# Patient Record
Sex: FEMALE | Race: ASIAN | NOT HISPANIC OR LATINO | Employment: FULL TIME | URBAN - METROPOLITAN AREA
[De-identification: names, ages, dates, MRNs, and addresses within clinical notes are randomized per-mention and may not be internally consistent; named-entity substitution may affect disease eponyms.]

---

## 2017-03-07 ENCOUNTER — TRANSFERRED RECORDS (OUTPATIENT)
Dept: HEALTH INFORMATION MANAGEMENT | Facility: CLINIC | Age: 27
End: 2017-03-07

## 2017-03-14 DIAGNOSIS — H53.10 SUBJECTIVE VISUAL DISTURBANCE: Primary | ICD-10-CM

## 2024-09-12 ASSESSMENT — PATIENT HEALTH QUESTIONNAIRE - PHQ9: SUM OF ALL RESPONSES TO PHQ QUESTIONS 1-9: 3

## 2024-09-12 ASSESSMENT — ANXIETY QUESTIONNAIRES: GAD7 TOTAL SCORE: 1

## 2024-09-13 ENCOUNTER — OFFICE VISIT (OUTPATIENT)
Dept: FAMILY MEDICINE | Facility: CLINIC | Age: 34
End: 2024-09-13
Payer: COMMERCIAL

## 2024-09-13 VITALS
WEIGHT: 182 LBS | BODY MASS INDEX: 31.07 KG/M2 | SYSTOLIC BLOOD PRESSURE: 114 MMHG | HEART RATE: 104 BPM | TEMPERATURE: 97.3 F | DIASTOLIC BLOOD PRESSURE: 62 MMHG | HEIGHT: 64 IN | OXYGEN SATURATION: 96 %

## 2024-09-13 DIAGNOSIS — Z13.29 SCREENING FOR THYROID DISORDER: ICD-10-CM

## 2024-09-13 DIAGNOSIS — M32.9 SYSTEMIC LUPUS ERYTHEMATOSUS, UNSPECIFIED SLE TYPE, UNSPECIFIED ORGAN INVOLVEMENT STATUS (H): ICD-10-CM

## 2024-09-13 DIAGNOSIS — Z00.00 ROUTINE GENERAL MEDICAL EXAMINATION AT A HEALTH CARE FACILITY: ICD-10-CM

## 2024-09-13 DIAGNOSIS — Z11.4 SCREENING FOR HIV (HUMAN IMMUNODEFICIENCY VIRUS): Primary | ICD-10-CM

## 2024-09-13 DIAGNOSIS — Z12.4 CERVICAL CANCER SCREENING: ICD-10-CM

## 2024-09-13 DIAGNOSIS — Z13.220 LIPID SCREENING: ICD-10-CM

## 2024-09-13 DIAGNOSIS — M32.14 LUPUS NEPHRITIS, ISN/RPS CLASS IV (H): ICD-10-CM

## 2024-09-13 DIAGNOSIS — Z13.1 SCREENING FOR DIABETES MELLITUS: ICD-10-CM

## 2024-09-13 DIAGNOSIS — Z11.59 NEED FOR HEPATITIS C SCREENING TEST: ICD-10-CM

## 2024-09-13 PROBLEM — F41.8 DEPRESSION WITH ANXIETY: Status: ACTIVE | Noted: 2017-05-03

## 2024-09-13 PROBLEM — R80.1 PERSISTENT PROTEINURIA: Status: ACTIVE | Noted: 2020-01-03

## 2024-09-13 LAB
ALBUMIN SERPL BCG-MCNC: 3 G/DL (ref 3.5–5.2)
ALBUMIN UR-MCNC: >=300 MG/DL
ALP SERPL-CCNC: 79 U/L (ref 40–150)
ALT SERPL W P-5'-P-CCNC: 29 U/L (ref 0–50)
ANION GAP SERPL CALCULATED.3IONS-SCNC: 10 MMOL/L (ref 7–15)
APPEARANCE UR: CLEAR
AST SERPL W P-5'-P-CCNC: 30 U/L (ref 0–45)
BACTERIA #/AREA URNS HPF: ABNORMAL /HPF
BILIRUB SERPL-MCNC: 0.3 MG/DL
BILIRUB UR QL STRIP: NEGATIVE
BUN SERPL-MCNC: 8.9 MG/DL (ref 6–20)
CALCIUM SERPL-MCNC: 8.8 MG/DL (ref 8.8–10.4)
CHLORIDE SERPL-SCNC: 104 MMOL/L (ref 98–107)
CHOLEST SERPL-MCNC: 319 MG/DL
COLOR UR AUTO: YELLOW
CREAT SERPL-MCNC: 0.65 MG/DL (ref 0.51–0.95)
CREAT UR-MCNC: 172 MG/DL
EGFRCR SERPLBLD CKD-EPI 2021: >90 ML/MIN/1.73M2
ERYTHROCYTE [DISTWIDTH] IN BLOOD BY AUTOMATED COUNT: 14.8 % (ref 10–15)
FASTING STATUS PATIENT QL REPORTED: ABNORMAL
FASTING STATUS PATIENT QL REPORTED: ABNORMAL
GLUCOSE SERPL-MCNC: 96 MG/DL (ref 70–99)
GLUCOSE UR STRIP-MCNC: NEGATIVE MG/DL
HBA1C MFR BLD: 5.3 % (ref 0–5.6)
HCO3 SERPL-SCNC: 22 MMOL/L (ref 22–29)
HCT VFR BLD AUTO: 42.1 % (ref 35–47)
HDLC SERPL-MCNC: 58 MG/DL
HGB BLD-MCNC: 13.9 G/DL (ref 11.7–15.7)
HGB UR QL STRIP: ABNORMAL
KETONES UR STRIP-MCNC: NEGATIVE MG/DL
LDLC SERPL CALC-MCNC: 222 MG/DL
LEUKOCYTE ESTERASE UR QL STRIP: NEGATIVE
MCH RBC QN AUTO: 26.9 PG (ref 26.5–33)
MCHC RBC AUTO-ENTMCNC: 33 G/DL (ref 31.5–36.5)
MCV RBC AUTO: 81 FL (ref 78–100)
MICROALBUMIN UR-MCNC: >4400 MG/L
MICROALBUMIN/CREAT UR: NORMAL MG/G{CREAT}
NITRATE UR QL: NEGATIVE
NONHDLC SERPL-MCNC: 261 MG/DL
PH UR STRIP: 6.5 [PH] (ref 5–8)
PLATELET # BLD AUTO: 329 10E3/UL (ref 150–450)
POTASSIUM SERPL-SCNC: 3.8 MMOL/L (ref 3.4–5.3)
PROT SERPL-MCNC: 6.2 G/DL (ref 6.4–8.3)
RBC # BLD AUTO: 5.17 10E6/UL (ref 3.8–5.2)
RBC #/AREA URNS AUTO: ABNORMAL /HPF
SODIUM SERPL-SCNC: 136 MMOL/L (ref 135–145)
SP GR UR STRIP: >=1.03 (ref 1–1.03)
SQUAMOUS #/AREA URNS AUTO: ABNORMAL /LPF
T4 FREE SERPL-MCNC: 0.98 NG/DL (ref 0.9–1.7)
TRIGL SERPL-MCNC: 196 MG/DL
TSH SERPL DL<=0.005 MIU/L-ACNC: 5.76 UIU/ML (ref 0.3–4.2)
UROBILINOGEN UR STRIP-ACNC: 0.2 E.U./DL
WBC # BLD AUTO: 7.5 10E3/UL (ref 4–11)
WBC #/AREA URNS AUTO: ABNORMAL /HPF

## 2024-09-13 PROCEDURE — 90471 IMMUNIZATION ADMIN: CPT | Performed by: PHYSICIAN ASSISTANT

## 2024-09-13 PROCEDURE — 85027 COMPLETE CBC AUTOMATED: CPT | Performed by: PHYSICIAN ASSISTANT

## 2024-09-13 PROCEDURE — 80053 COMPREHEN METABOLIC PANEL: CPT | Performed by: PHYSICIAN ASSISTANT

## 2024-09-13 PROCEDURE — 99385 PREV VISIT NEW AGE 18-39: CPT | Mod: 25 | Performed by: PHYSICIAN ASSISTANT

## 2024-09-13 PROCEDURE — 82043 UR ALBUMIN QUANTITATIVE: CPT | Performed by: PHYSICIAN ASSISTANT

## 2024-09-13 PROCEDURE — 83036 HEMOGLOBIN GLYCOSYLATED A1C: CPT | Performed by: PHYSICIAN ASSISTANT

## 2024-09-13 PROCEDURE — 82570 ASSAY OF URINE CREATININE: CPT | Performed by: PHYSICIAN ASSISTANT

## 2024-09-13 PROCEDURE — 80061 LIPID PANEL: CPT | Performed by: PHYSICIAN ASSISTANT

## 2024-09-13 PROCEDURE — 99214 OFFICE O/P EST MOD 30 MIN: CPT | Mod: 25 | Performed by: PHYSICIAN ASSISTANT

## 2024-09-13 PROCEDURE — 36415 COLL VENOUS BLD VENIPUNCTURE: CPT | Performed by: PHYSICIAN ASSISTANT

## 2024-09-13 PROCEDURE — 84439 ASSAY OF FREE THYROXINE: CPT | Performed by: PHYSICIAN ASSISTANT

## 2024-09-13 PROCEDURE — 81001 URINALYSIS AUTO W/SCOPE: CPT | Performed by: PHYSICIAN ASSISTANT

## 2024-09-13 PROCEDURE — 90656 IIV3 VACC NO PRSV 0.5 ML IM: CPT | Performed by: PHYSICIAN ASSISTANT

## 2024-09-13 PROCEDURE — 84443 ASSAY THYROID STIM HORMONE: CPT | Performed by: PHYSICIAN ASSISTANT

## 2024-09-13 ASSESSMENT — PATIENT HEALTH QUESTIONNAIRE - PHQ9
SUM OF ALL RESPONSES TO PHQ QUESTIONS 1-9: 3
SUM OF ALL RESPONSES TO PHQ QUESTIONS 1-9: 3
10. IF YOU CHECKED OFF ANY PROBLEMS, HOW DIFFICULT HAVE THESE PROBLEMS MADE IT FOR YOU TO DO YOUR WORK, TAKE CARE OF THINGS AT HOME, OR GET ALONG WITH OTHER PEOPLE: NOT DIFFICULT AT ALL

## 2024-09-13 ASSESSMENT — ANXIETY QUESTIONNAIRES
GAD7 TOTAL SCORE: 1
8. IF YOU CHECKED OFF ANY PROBLEMS, HOW DIFFICULT HAVE THESE MADE IT FOR YOU TO DO YOUR WORK, TAKE CARE OF THINGS AT HOME, OR GET ALONG WITH OTHER PEOPLE?: NOT DIFFICULT AT ALL
GAD7 TOTAL SCORE: 1
GAD7 TOTAL SCORE: 1
7. FEELING AFRAID AS IF SOMETHING AWFUL MIGHT HAPPEN: NOT AT ALL

## 2024-09-13 NOTE — PROGRESS NOTES
Preventive Care Visit  [facility] RAUL Caal PA-C, Family Medicine  Sep 13, 2024      Assessment & Plan       Cervical cancer screening  Will schedule visit, reports she is not ready today    Systemic lupus erythematosus, unspecified SLE type, unspecified organ involvement status (H)  Referral to rheumatology  - Comprehensive metabolic panel (BMP + Alb, Alk Phos, ALT, AST, Total. Bili, TP)  - CBC with platelets  - Adult Rheumatology  Referral  - Comprehensive metabolic panel (BMP + Alb, Alk Phos, ALT, AST, Total. Bili, TP)  - CBC with platelets    Lupus nephritis, ISN/RPS class IV (H)  Referral to nephrology  - Adult Nephrology  Referral  - UA Macroscopic with reflex to Microscopic and Culture - Clinic Collect    Screening for diabetes mellitus  - Hemoglobin A1c  - Hemoglobin A1c    Screening for thyroid disorder  - TSH with free T4 reflex  - TSH with free T4 reflex    Lipid screening  - Lipid Profile (Chol, Trig, HDL, LDL calc)  - Lipid Profile (Chol, Trig, HDL, LDL calc)    Routine general medical examination at a health care facility      Schedule specialist appointments, follow up in 2-3 weeks for pap and to develop a plan if unable to get into specialists.             Counseling  Appropriate preventive services were addressed with this patient via screening, questionnaire, or discussion as appropriate for fall prevention, nutrition, physical activity, Tobacco-use cessation, social engagement, weight loss and cognition.  Checklist reviewing preventive services available has been given to the patient.  Reviewed patient's diet, addressing concerns and/or questions.   She is at risk for lack of exercise and has been provided with information to increase physical activity for the benefit of her well-being.   The patient was instructed to see the dentist every 6 months.   She is at risk for psychosocial distress and has been provided with information to reduce risk.            Aaron Francisco is a 34 year old, presenting for the following:  Establish Care and Physical        9/13/2024    11:08 AM   Additional Questions   Roomed by Linda ELLSWORTH        Health Care Directive  Patient does not have a Health Care Directive or Living Will: Discussed advance care planning with patient; however, patient declined at this time.    History of Present Illness       Reason for visit:  Est care/ referrals to specialty. Would like the flu and covid shot please.   She is taking medications regularly.      Establish care, previous patient at [facility] but hasn't followed up in 3 years.    Has history of lupus and lupus nephritis previously seen in rheumatology and nephrology but again has not seen them in about 3 years.  Was previously on CellCept and Plaquenil.  She last took these medications about 2 years ago        9/13/2024   General Health   How would you rate your overall physical health? (!) FAIR   Feel stress (tense, anxious, or unable to sleep) Only a little      (!) STRESS CONCERN      9/13/2024   Nutrition   Three or more servings of calcium each day? Yes   Diet: Low salt   How many servings of fruit and vegetables per day? (!) 2-3   How many sweetened beverages each day? 0-1            9/13/2024   Exercise   Days per week of moderate/strenous exercise 1 day   Average minutes spent exercising at this level 20 min      (!) EXERCISE CONCERN      9/13/2024   Social Factors   Frequency of gathering with friends or relatives Once a week   Worry food won't last until get money to buy more No   Food not last or not have enough money for food? No   Do you have housing? (Housing is defined as stable permanent housing and does not include staying ouside in a car, in a tent, in an abandoned building, in an overnight shelter, or couch-surfing.) Yes   Are you worried about losing your housing? No   Lack of transportation? No   Unable to get utilities (heat,electricity)? No            9/13/2024  "  Dental   Dentist two times every year? (!) NO            2024   TB Screening   Were you born outside of the [place]? Yes          Today's PHQ-9 Score:       2024    11:04 AM   PHQ-9 SCORE   PHQ-9 Total Score MyChart 3 (Minimal depression)   PHQ-9 Total Score 3         2024   Substance Use   Alcohol more than 3/day or more than 7/wk No   Do you use any other substances recreationally? No        Social History     Tobacco Use    Smoking status: Former     Current packs/day: 0.00     Average packs/day: 0.5 packs/day for 7.0 years (3.5 ttl pk-yrs)     Types: Cigarettes     Start date: 2012     Quit date: 2019     Years since quittin.7    Smokeless tobacco: Never   Substance Use Topics    Alcohol use: Yes     Comment: Once a week    Drug use: Never          Mammogram Screening - Patient under 40 years of age: Routine Mammogram Screening not recommended.           2024   One time HIV Screening   Previous HIV test? No          2024   STI Screening   New sexual partner(s) since last STI/HIV test? No        History of abnormal Pap smear: No - age 30- 64 PAP with HPV every 5 years recommended             2024   Contraception/Family Planning   Questions about contraception or family planning (!) YES            Reviewed and updated as needed this visit by Provider   Tobacco  Allergies  Meds  Problems  Med Hx  Surg Hx  Fam Hx                  Review of Systems  Constitutional, HEENT, cardiovascular, pulmonary, gi and gu systems are negative, except as otherwise noted.     Objective    Exam  /62   Pulse 104   Temp 97.3  F (36.3  C)   Ht 1.626 m (5' 4\")   Wt 82.6 kg (182 lb)   LMP 2024   SpO2 96%   Breastfeeding No   BMI 31.24 kg/m     Estimated body mass index is 31.24 kg/m  as calculated from the following:    Height as of this encounter: 1.626 m (5' 4\").    Weight as of this encounter: 82.6 kg (182 lb).    Physical Exam  GENERAL: alert and no distress  EYES: Eyes " grossly normal to inspection, PERRL and conjunctivae and sclerae normal  HENT: ear canals and TM's normal, nose and mouth without ulcers or lesions  NECK: no adenopathy, no asymmetry, masses, or scars  RESP: lungs clear to auscultation - no rales, rhonchi or wheezes  CV: regular rate and rhythm, normal S1 S2, no S3 or S4, no murmur, click or rub, no peripheral edema  ABDOMEN: soft, nontender, no hepatosplenomegaly, no masses and bowel sounds normal  MS: no gross musculoskeletal defects noted, no edema  SKIN: no suspicious lesions or rashes  NEURO: Normal strength and tone, mentation intact and speech normal  PSYCH: mentation appears normal, affect normal/bright        Signed Electronically by: Yaritza Caal PA-C

## 2024-09-13 NOTE — PATIENT INSTRUCTIONS
Patient Education   Preventive Care Advice   This is general advice given by our system to help you stay healthy. However, your care team may have specific advice just for you. Please talk to your care team about your preventive care needs.  Nutrition  Eat 5 or more servings of fruits and vegetables each day.  Try wheat bread, brown rice and whole grain pasta (instead of white bread, rice, and pasta).  Get enough calcium and vitamin D. Check the label on foods and aim for 100% of the RDA (recommended daily allowance).  Lifestyle  Exercise at least 150 minutes each week  (30 minutes a day, 5 days a week).  Do muscle strengthening activities 2 days a week. These help control your weight and prevent disease.  No smoking.  Wear sunscreen to prevent skin cancer.  Have a dental exam and cleaning every 6 months.  Yearly exams  See your health care team every year to talk about:  Any changes in your health.  Any medicines your care team has prescribed.  Preventive care, family planning, and ways to prevent chronic diseases.  Shots (vaccines)   HPV shots (up to age 26), if you've never had them before.  Hepatitis B shots (up to age 59), if you've never had them before.  COVID-19 shot: Get this shot when it's due.  Flu shot: Get a flu shot every year.  Tetanus shot: Get a tetanus shot every 10 years.  Pneumococcal, hepatitis A, and RSV shots: Ask your care team if you need these based on your risk.  Shingles shot (for age 50 and up)  General health tests  Diabetes screening:  Starting at age 35, Get screened for diabetes at least every 3 years.  If you are younger than age 35, ask your care team if you should be screened for diabetes.  Cholesterol test: At age 39, start having a cholesterol test every 5 years, or more often if advised.  Bone density scan (DEXA): At age 50, ask your care team if you should have this scan for osteoporosis (brittle bones).  Hepatitis C: Get tested at least once in your life.  STIs (sexually  transmitted infections)  Before age 24: Ask your care team if you should be screened for STIs.  After age 24: Get screened for STIs if you're at risk. You are at risk for STIs (including HIV) if:  You are sexually active with more than one person.  You don't use condoms every time.  You or a partner was diagnosed with a sexually transmitted infection.  If you are at risk for HIV, ask about PrEP medicine to prevent HIV.  Get tested for HIV at least once in your life, whether you are at risk for HIV or not.  Cancer screening tests  Cervical cancer screening: If you have a cervix, begin getting regular cervical cancer screening tests starting at age 21.  Breast cancer scan (mammogram): If you've ever had breasts, begin having regular mammograms starting at age 40. This is a scan to check for breast cancer.  Colon cancer screening: It is important to start screening for colon cancer at age 45.  Have a colonoscopy test every 10 years (or more often if you're at risk) Or, ask your provider about stool tests like a FIT test every year or Cologuard test every 3 years.  To learn more about your testing options, visit:   .  For help making a decision, visit:   https://bit.ly/cm49969.  Prostate cancer screening test: If you have a prostate, ask your care team if a prostate cancer screening test (PSA) at age 55 is right for you.  Lung cancer screening: If you are a current or former smoker ages 50 to 80, ask your care team if ongoing lung cancer screenings are right for you.  For informational purposes only. Not to replace the advice of your health care provider. Copyright   2023 [employer]. All rights reserved. Clinically reviewed by the [facility]. doxo [id number] - REV 01/24.

## 2024-09-23 ENCOUNTER — MYC MEDICAL ADVICE (OUTPATIENT)
Dept: FAMILY MEDICINE | Facility: CLINIC | Age: 34
End: 2024-09-23
Payer: COMMERCIAL

## 2024-09-23 DIAGNOSIS — N18.31 STAGE 3A CHRONIC KIDNEY DISEASE (H): Primary | ICD-10-CM

## 2024-10-18 ENCOUNTER — OFFICE VISIT (OUTPATIENT)
Dept: FAMILY MEDICINE | Facility: CLINIC | Age: 34
End: 2024-10-18
Payer: COMMERCIAL

## 2024-10-18 VITALS
OXYGEN SATURATION: 84 % | TEMPERATURE: 98.2 F | DIASTOLIC BLOOD PRESSURE: 91 MMHG | HEIGHT: 64 IN | BODY MASS INDEX: 30.39 KG/M2 | RESPIRATION RATE: 12 BRPM | WEIGHT: 178 LBS | SYSTOLIC BLOOD PRESSURE: 138 MMHG | HEART RATE: 84 BPM

## 2024-10-18 DIAGNOSIS — Z11.59 NEED FOR HEPATITIS C SCREENING TEST: Primary | ICD-10-CM

## 2024-10-18 DIAGNOSIS — Z13.220 LIPID SCREENING: ICD-10-CM

## 2024-10-18 DIAGNOSIS — E78.5 DYSLIPIDEMIA: ICD-10-CM

## 2024-10-18 DIAGNOSIS — Z12.4 CERVICAL CANCER SCREENING: ICD-10-CM

## 2024-10-18 DIAGNOSIS — E03.8 SUBCLINICAL HYPOTHYROIDISM: ICD-10-CM

## 2024-10-18 DIAGNOSIS — M32.14 LUPUS NEPHRITIS, ISN/RPS CLASS IV (H): ICD-10-CM

## 2024-10-18 DIAGNOSIS — N94.10 DYSPAREUNIA IN FEMALE: ICD-10-CM

## 2024-10-18 LAB
CHOLEST SERPL-MCNC: 296 MG/DL
CLUE CELLS: ABNORMAL
FASTING STATUS PATIENT QL REPORTED: ABNORMAL
HCV AB SERPL QL IA: NONREACTIVE
HDLC SERPL-MCNC: 65 MG/DL
LDLC SERPL CALC-MCNC: 208 MG/DL
NONHDLC SERPL-MCNC: 231 MG/DL
T4 FREE SERPL-MCNC: 0.97 NG/DL (ref 0.9–1.7)
TRICHOMONAS, WET PREP: ABNORMAL
TRIGL SERPL-MCNC: 117 MG/DL
TSH SERPL DL<=0.005 MIU/L-ACNC: 6.07 UIU/ML (ref 0.3–4.2)
WBC'S/HIGH POWER FIELD, WET PREP: ABNORMAL
YEAST, WET PREP: ABNORMAL

## 2024-10-18 PROCEDURE — 87624 HPV HI-RISK TYP POOLED RSLT: CPT | Performed by: PHYSICIAN ASSISTANT

## 2024-10-18 PROCEDURE — 84443 ASSAY THYROID STIM HORMONE: CPT | Performed by: PHYSICIAN ASSISTANT

## 2024-10-18 PROCEDURE — 90472 IMMUNIZATION ADMIN EACH ADD: CPT | Performed by: PHYSICIAN ASSISTANT

## 2024-10-18 PROCEDURE — 91320 SARSCV2 VAC 30MCG TRS-SUC IM: CPT | Performed by: PHYSICIAN ASSISTANT

## 2024-10-18 PROCEDURE — 90480 ADMN SARSCOV2 VAC 1/ONLY CMP: CPT | Performed by: PHYSICIAN ASSISTANT

## 2024-10-18 PROCEDURE — 36415 COLL VENOUS BLD VENIPUNCTURE: CPT | Performed by: PHYSICIAN ASSISTANT

## 2024-10-18 PROCEDURE — 86376 MICROSOMAL ANTIBODY EACH: CPT | Performed by: PHYSICIAN ASSISTANT

## 2024-10-18 PROCEDURE — 90746 HEPB VACCINE 3 DOSE ADULT IM: CPT | Performed by: PHYSICIAN ASSISTANT

## 2024-10-18 PROCEDURE — 90471 IMMUNIZATION ADMIN: CPT | Performed by: PHYSICIAN ASSISTANT

## 2024-10-18 PROCEDURE — 90677 PCV20 VACCINE IM: CPT | Performed by: PHYSICIAN ASSISTANT

## 2024-10-18 PROCEDURE — 87210 SMEAR WET MOUNT SALINE/INK: CPT | Performed by: PHYSICIAN ASSISTANT

## 2024-10-18 PROCEDURE — 80061 LIPID PANEL: CPT | Performed by: PHYSICIAN ASSISTANT

## 2024-10-18 PROCEDURE — 86803 HEPATITIS C AB TEST: CPT | Performed by: PHYSICIAN ASSISTANT

## 2024-10-18 PROCEDURE — 84439 ASSAY OF FREE THYROXINE: CPT | Performed by: PHYSICIAN ASSISTANT

## 2024-10-18 PROCEDURE — 99214 OFFICE O/P EST MOD 30 MIN: CPT | Mod: 25 | Performed by: PHYSICIAN ASSISTANT

## 2024-10-18 PROCEDURE — G0145 SCR C/V CYTO,THINLAYER,RESCR: HCPCS | Performed by: PHYSICIAN ASSISTANT

## 2024-10-18 NOTE — PROGRESS NOTES
"  Assessment & Plan     Need for hepatitis C screening test  - Hepatitis C Screen Reflex to HCV RNA Quant and Genotype  - Hepatitis C Screen Reflex to HCV RNA Quant and Genotype    Cervical cancer screening  - HPV and Gynecologic Cytology Panel - Recommended Age 30 - 65 Years    Subclinical hypothyroidism  Recheck tsh today  - TSH with free T4 reflex  - Thyroid peroxidase antibody  - TSH with free T4 reflex  - Thyroid peroxidase antibody    Lipid screening  - Lipid Profile (Chol, Trig, HDL, LDL calc)  - Lipid Profile (Chol, Trig, HDL, LDL calc)    Dyspareunia in female  Will get wet prep  - Wet prep - Clinic Collect    Lupus nephritis, ISN/RPS class IV (H)  Has appointment set up with nephrology, waiting to hear from rheumatology about an appointment.     The longitudinal plan of care for the diagnosis(es)/condition(s) as documented were addressed during this visit. Due to the added complexity in care, I will continue to support Maryam in the subsequent management and with ongoing continuity of care.          BMI  Estimated body mass index is 30.55 kg/m  as calculated from the following:    Height as of this encounter: 1.626 m (5' 4\").    Weight as of this encounter: 80.7 kg (178 lb).             Aaron Francisco is a 34 year old, presenting for the following health issues:  Results and Repeat Pap Smear      10/18/2024     9:16 AM   Additional Questions   Roomed by Nahun     History of Present Illness       CKD: She uses over the counter pain medication, including advil, a few times a month.    Reason for visit:  Lab follow up    She eats 2-3 servings of fruits and vegetables daily.She consumes 0 sweetened beverage(s) daily.She exercises with enough effort to increase her heart rate 20 to 29 minutes per day.  She exercises with enough effort to increase her heart rate 3 or less days per week.   She is taking medications regularly.     Needs pap today, reports she has never had a prior pap.  Denies vaginal discharge " "but does not dyspareunia the last time she had intercourse.     Subclinical hypothyroidism- needs labs today    Lupus nephritis, has appointment scheduled with nephrology and is waiting to hear when she can be seen in rheumatology.       Review of Systems  Constitutional, HEENT, cardiovascular, pulmonary, gi and gu systems are negative, except as otherwise noted.      Objective    BP (!) 138/91   Pulse 84   Temp 98.2  F (36.8  C) (Oral)   Resp 12   Ht 1.626 m (5' 4\")   Wt 80.7 kg (178 lb)   LMP 09/19/2024   SpO2 (!) 84%   BMI 30.55 kg/m    Body mass index is 30.55 kg/m .  Physical Exam   GENERAL: alert and no distress  EYES: Eyes grossly normal to inspection, PERRL and conjunctivae and sclerae normal  RESP: lungs clear to auscultation - no rales, rhonchi or wheezes  CV: regular rate and rhythm,  no peripheral edema   (female): normal female external genitalia, normal urethral meatus, normal vaginal mucosa  MS: no gross musculoskeletal defects noted, no edema  SKIN: no suspicious lesions or rashes  NEURO: Normal strength and tone, mentation intact and speech normal  PSYCH: mentation appears normal, affect normal/bright            Signed Electronically by: Yartiza Caal PA-C    "

## 2024-10-21 LAB
HPV HR 12 DNA CVX QL NAA+PROBE: NEGATIVE
HPV16 DNA CVX QL NAA+PROBE: NEGATIVE
HPV18 DNA CVX QL NAA+PROBE: NEGATIVE
HUMAN PAPILLOMA VIRUS FINAL DIAGNOSIS: NORMAL
THYROPEROXIDASE AB SERPL-ACNC: <10 IU/ML

## 2024-10-24 PROBLEM — Z12.4 CERVICAL CANCER SCREENING: Status: ACTIVE | Noted: 2024-10-24

## 2024-10-24 LAB
BKR AP ASSOCIATED HPV REPORT: NORMAL
BKR LAB AP GYN ADEQUACY: NORMAL
BKR LAB AP GYN INTERPRETATION: NORMAL
BKR LAB AP PREVIOUS ABNORMAL: NORMAL
PATH REPORT.COMMENTS IMP SPEC: NORMAL
PATH REPORT.COMMENTS IMP SPEC: NORMAL
PATH REPORT.RELEVANT HX SPEC: NORMAL

## 2024-10-31 RX ORDER — ROSUVASTATIN CALCIUM 10 MG/1
10 TABLET, COATED ORAL DAILY
Qty: 90 TABLET | Refills: 3 | Status: SHIPPED | OUTPATIENT
Start: 2024-10-31

## 2025-03-18 ENCOUNTER — DOCUMENTATION ONLY (OUTPATIENT)
Dept: NEPHROLOGY | Facility: CLINIC | Age: 35
End: 2025-03-18
Payer: COMMERCIAL

## 2025-03-22 DIAGNOSIS — N04.9 NEPHROTIC SYNDROME: Primary | ICD-10-CM

## 2025-03-22 DIAGNOSIS — M32.14 LUPUS NEPHRITIS, ISN/RPS CLASS IV (H): Primary | ICD-10-CM

## 2025-04-08 ENCOUNTER — VIRTUAL VISIT (OUTPATIENT)
Dept: NEPHROLOGY | Facility: CLINIC | Age: 35
End: 2025-04-08
Attending: PHYSICIAN ASSISTANT
Payer: COMMERCIAL

## 2025-04-08 ENCOUNTER — TELEPHONE (OUTPATIENT)
Dept: NEPHROLOGY | Facility: CLINIC | Age: 35
End: 2025-04-08

## 2025-04-08 DIAGNOSIS — M32.14 LUPUS NEPHRITIS, ISN/RPS CLASS IV (H): ICD-10-CM

## 2025-04-08 NOTE — TELEPHONE ENCOUNTER
Left vm for patient to inform her that her 9 am appointment today with Dr Esqueda has to be changed to a virtual as the provider will not be physically in clinic.   Third attempt to contact.  Anu RN Care Coordinator  Nephrology     normal/clear to auscultation bilaterally/no wheezes/no rales/no rhonchi

## 2025-04-08 NOTE — PROGRESS NOTES
Nephrology Clinic Visit  Maryam Stanley MRN: [id number] YOB: 1990  Primary Care Provider: System, Provider Not In  ----------------------------------------------------------------------------------------------------------------------  Patient was a no show. There are no charges associated with this visit. Signing note to close opened encounter.     No charge LOS as patient did not connect to video or answer phone calls.     Below documentation is for continuity purposes only.     I note that patient has nephro appt on 4/30/2025 with outside group.     Visit 4/8/2025:  --BP Control:  --In office:   --At home:   --Current Regimen: None  -DM Control:  --Current Regimen: None  -Creatinine Trend:  from 0.64 (9/13/2024)  -Nocturia:  -Hematuria:  -Nephrolithiasis:  -NSAID Use:  -Herbal/OTC Medication Use:  -Family History of Kidney Disease:  -Family/Friends on RRT/Kidney Transplant:  -Social History:    -Other:    Objective:  PAST MEDICAL HISTORY:  Past Medical History:   Diagnosis Date    Depressive disorder 2020    Winter of 2020 i experienced some depression ans anxiety.       PAST SURGICAL HISTORY:  Past Surgical History:   Procedure Laterality Date    BIOPSY  2022    Kidney biopsy       MEDICATIONS:  Current Outpatient Medications   Medication Instructions    rosuvastatin (CRESTOR) 10 mg, Oral, DAILY       FAMILY MEDICAL HISTORY:   Family History   Problem Relation Age of Onset    Diabetes Type 2  Mother     Obesity Mother     Hypertension Father     Lupus Sister     Depression Sister        PHYSICAL EXAM:   There were no vitals taken for this visit.  GENERAL APPEARANCE: alert and no distress  EYES: nonicteric  HENT: mouth without ulcers or lesions  RESP: lungs clear to auscultation   CV: regular rhythm, normal rate, no rub  ABDOMEN: soft, nontender, normal bowel sounds, no HSM   Extremities: no clubbing, cyanosis, or edema  MS: no evidence of inflammation in joints, no muscle tenderness  SKIN: no  "rash  NEURO: mentation intact and speech normal  PSYCH: affect normal    LABS REVIEWED BY ME:   ANEMIA  Recent Labs   Lab Test 09/13/24  1146   HGB 13.9       BMP  Recent Labs   Lab Test 09/13/24  1146      POTASSIUM 3.8   CHLORIDE 104   CO2 22   ANIONGAP 10   BUN 8.9   CR 0.65   GFRESTIMATED >90   PROTTOTAL 6.2*       CBC  Recent Labs   Lab Test 09/13/24  1146   HGB 13.9   WBC 7.5   HCT 42.1   MCV 81          DIABETES  Recent Labs   Lab Test 09/13/24  1146   A1C 5.3       HYPONATREMIANo lab results found.    Invalid input(s): \"UOSM\", \"OSM\"    MBD  Recent Labs   Lab Test 09/13/24  1146   DAMIAN 8.8   ALBUMIN 3.0*        URINE STUDIES  Recent Labs   Lab Test 09/13/24  1148   COLOR Yellow   APPEARANCE Clear   URINEGLC Negative   URINEBILI Negative   URINEKETONE Negative   SG >=1.030   UBLD Small*   URINEPH 6.5   PROTEIN >=300*   UROBILINOGEN 0.2   NITRITE Negative   LEUKEST Negative   RBCU 2-5*   WBCU 5-10*     No lab results found.    ADDITIONAL LABS ORDERED/REVIEWED BY ME:  See below    Assessment/Plan  CKD Stage G1A3?  Established care with [facility] Nephro 4/8/2025    34 year old female w/ Hx notable for SLE/LN (Class IV via Kidney Biopsy 5/2011)    Previously followed with Dr Muñoz ([facility]) w/ last visit from records available to me on 6/20/2022. At that time, patient was resumed on Cellcept 500mg BID (with plans to increase to 1000mg BID) and was resumed on Plaquenil 200mg BID. Per Dr Muñoz's documentation, it appears she was first diagnosed w/ LN ~5/2011 when biopsy was done at [facility] (following with Dr Woods at that time). She has Creatinine of 1.2 and UPCR of 11.8g/day. She was started on Cellcept/Prednisone with excellent response (UPCR down to 0.4g/day by 6/3/2011). She reportedly stopped her medications ~2013/2014. Was resumed on cellcept ~12/2019 at 500mg BID. Had repeat biopsy 1/14/2021 which showed class IV/V LN w/ similar chronicity to findings in 2011 (<10% IFTA). Only intermittent " adherence to cellcept leading up to her last nephro follow up on 6/20/2022.    CKD Etiology (Biopsy Proven: Yes, 5/2011):  -LN (Class IV/V)    Plan:  - Repeat Reanl Biopsy given   -Pending renal biopsy results, will likely move forward with the following:  --re-introduction of MMF (and consideration of myfortic given previous GI issues) 500mg BID -> 1000mg BID after 2 weeks,   --IV Methylpred 500mg x 3 followed by 40mg of Prednisone tapered over ~3 months   --Consideration of triple therapy with Belimumab vs Voclosporine as additional agent  ---Note: Has reporteldy poorly tolerated prednisone in the past. May need to move forward with triple therapy and follow protocol used in  for Voclosporin (20mg of pred qDay -> taper to < or equal to 5 by 3 months)  -Plaquenil 400mg qDay (<5/mg/kg/day)  -We reviewed that in patients who desire pregnancy w/ LN, I recommend 6 months of quiescent disease on medications that are compatible with pregnancy (notably our typical induction agents - MMF or Cyclophosphamide for LN Class IV - are NOT compatible with pregnancy). We would want to achieve remission and adjust her to a maintenance agent (such as Azathioprine) prior to pregnancy to give her the best chance of an optimal outcome. I would also recommend the use of ASA 81mg qDay to reduce the risk of pre-eclampasia (typically started at ~12 weeks of pregnancy)  -Optimize BP Control: Olmesartan  -Discussed SGLT2i:   -Discussed potential need for PJP prophylaxis  -Discussed importance of routine follow up with nephro and rheumatology  -Discussed importance of using 2 methods of reliable contraception while on MMF  -Discussed need for negative urine pregnancy before proceeding with therapy  -Remainder per problem below      Anemia of Renal Disease  Hemoglobin   Date Value Ref Range Status   09/13/2024 13.9 11.7 - 15.7 g/dL Final       Plan:  -      Lipid Management in CKD  KDIGO 2013 Guidelines recommend the following for patients  with CKD:  Adults >/= 49 yo w/ CKD stage 1 or 2: Statin  Adults >/= 49 yo w/ CKD stage 3-5: Statin + Ezetimibe or Statin alone  Adults 18-49 + 1 of the following (CAD, DM, Ischemic stroke, 10 yr ASCVD risk >10%): Statin    KDIGO guidelines recommend Simvastatin 20mg/Ezetimibe 10mg qDay for patients with CKD G3a-G5 (in line with the SHARP trial). If Simvastatin used alone, 40mg qDay is referenced.   Other options include: Atorvastatin 20mg qDay (4D trial) and Rosuvastatin 10mg qDay (XAVIER trial)    Plan:  -Rosuvastatin 10mg qDay      Metabolic Acidosis of Renal Disease  KIDIGO 2024 Guidelines recommend the following:  Practice Point 3.10.1: In people with CKD, consider use of pharmacological treatment with or without dietary intervention to prevent development of acidosis with potential clinical implications (e.g., serum bicarbonate <18 mmol/l in adults).  Practice Point 3.10.2: Monitor treatment for metabolic acidosis to ensure it does not result in serum bicarbonate concentrations exceeding the upper limit of normal and does not adversely affect BP control, serum potassium, or fluid status.    Bicarb:    Plan:  -      Mineral Bone Disease  KDIGO 2017 Guidelines recommend the following for patients with CKD L1z-H8A:  -Treatments of CKD MBD should be based on serial assessments of phosphate, calcium, and PTH levels, considered together (Not Graded).  -Lowering elevated phosphate levels toward the normal range (2C) and guiding decisions about phosphate-lowering treatment should be based on progressively or persistently elevated serum phosphate (Not Graded). Limiting dietary phosphate intake in the treatment of hyperphosphatemiaalone or in combination with other treatments (2D). It is reasonable to consider phosphate source (e.g., animal, vegetable, additives) in making dietary recommendations (Not Graded).  -Avoiding hypercalcemia (2C)  -In patients with CKD G3a-G5 not on dialysis, the optimal PTH level is not known.  However, we suggest that patients with levels of intact PTH progressively rising or persistently above the upper normal limit for the assay be evaluated for modifiable factors, including hyperphosphatemia, hypocalcemia, high phosphate intake, and vitamin D deficiency (2C).  -In adult patients with CKD G3a-G5 not on dialysis, we suggest that calcitriol and vitamin D analogs not be routinely used (2C). It is reasonable to reserve the use of calcitriol and vitamin D analogs for patients with CKD G4-G5 with severe and progressive hyperparathyroidism (Not Graded).    Calcium   Date Value Ref Range Status   09/13/2024 8.8 8.8 - 10.4 mg/dL Final     Comment:     Reference intervals for this test were updated on 7/16/2024 to reflect our healthy population more accurately. There may be differences in the flagging of prior results with similar values performed with this method. Those prior results can be interpreted in the context of the updated reference intervals.       Plan:  -    Other:  -Specialty Care Coordination Referral - CKD G1-G3b w/o imminent RRT planning/needs (Yes/no, Date Referral Placed):   -Med Therapy Management Referral (Yes/No, Date of Referral):     Return to clinic:     Castro Esqueda MD   of Medicine  Division of Nephrology and Hypertension  [facility]     minutes spent on the date of the encounter doing chart review, history and exam, documentation and further activities as noted above